# Patient Record
Sex: MALE | Race: WHITE
[De-identification: names, ages, dates, MRNs, and addresses within clinical notes are randomized per-mention and may not be internally consistent; named-entity substitution may affect disease eponyms.]

---

## 2018-05-25 ENCOUNTER — HOSPITAL ENCOUNTER (INPATIENT)
Dept: HOSPITAL 17 - NEPE | Age: 33
Discharge: LEFT BEFORE DISCHARGE | DRG: 917 | End: 2018-05-25
Attending: INTERNAL MEDICINE | Admitting: INTERNAL MEDICINE
Payer: SELF-PAY

## 2018-05-25 VITALS
OXYGEN SATURATION: 96 % | RESPIRATION RATE: 15 BRPM | HEART RATE: 75 BPM | DIASTOLIC BLOOD PRESSURE: 86 MMHG | SYSTOLIC BLOOD PRESSURE: 122 MMHG

## 2018-05-25 VITALS — HEART RATE: 61 BPM | RESPIRATION RATE: 17 BRPM | OXYGEN SATURATION: 95 %

## 2018-05-25 VITALS — HEIGHT: 71 IN | WEIGHT: 196.21 LBS | BODY MASS INDEX: 27.47 KG/M2

## 2018-05-25 VITALS
RESPIRATION RATE: 17 BRPM | HEART RATE: 61 BPM | SYSTOLIC BLOOD PRESSURE: 126 MMHG | DIASTOLIC BLOOD PRESSURE: 58 MMHG | OXYGEN SATURATION: 95 %

## 2018-05-25 VITALS
OXYGEN SATURATION: 95 % | TEMPERATURE: 98 F | RESPIRATION RATE: 18 BRPM | DIASTOLIC BLOOD PRESSURE: 57 MMHG | SYSTOLIC BLOOD PRESSURE: 117 MMHG | HEART RATE: 65 BPM

## 2018-05-25 VITALS
RESPIRATION RATE: 9 BRPM | TEMPERATURE: 97.7 F | HEART RATE: 68 BPM | OXYGEN SATURATION: 95 % | DIASTOLIC BLOOD PRESSURE: 57 MMHG | SYSTOLIC BLOOD PRESSURE: 109 MMHG

## 2018-05-25 VITALS
RESPIRATION RATE: 11 BRPM | OXYGEN SATURATION: 96 % | HEART RATE: 57 BPM | DIASTOLIC BLOOD PRESSURE: 59 MMHG | SYSTOLIC BLOOD PRESSURE: 110 MMHG

## 2018-05-25 VITALS — SYSTOLIC BLOOD PRESSURE: 125 MMHG | HEART RATE: 52 BPM | DIASTOLIC BLOOD PRESSURE: 77 MMHG

## 2018-05-25 VITALS
RESPIRATION RATE: 16 BRPM | SYSTOLIC BLOOD PRESSURE: 109 MMHG | DIASTOLIC BLOOD PRESSURE: 55 MMHG | OXYGEN SATURATION: 95 % | HEART RATE: 66 BPM

## 2018-05-25 DIAGNOSIS — T40.4X4A: Primary | ICD-10-CM

## 2018-05-25 DIAGNOSIS — J96.00: ICD-10-CM

## 2018-05-25 DIAGNOSIS — Z72.0: ICD-10-CM

## 2018-05-25 DIAGNOSIS — T40.1X4A: ICD-10-CM

## 2018-05-25 DIAGNOSIS — F19.10: ICD-10-CM

## 2018-05-25 LAB
ALBUMIN SERPL-MCNC: 4 GM/DL (ref 3.4–5)
ALP SERPL-CCNC: 65 U/L (ref 45–117)
ALT SERPL-CCNC: 88 U/L (ref 12–78)
AST SERPL-CCNC: 73 U/L (ref 15–37)
BASOPHILS # BLD AUTO: 0 TH/MM3 (ref 0–0.2)
BASOPHILS NFR BLD: 0.2 % (ref 0–2)
BILIRUB SERPL-MCNC: 0.7 MG/DL (ref 0.2–1)
BUN SERPL-MCNC: 19 MG/DL (ref 7–18)
CALCIUM SERPL-MCNC: 8.7 MG/DL (ref 8.5–10.1)
CHLORIDE SERPL-SCNC: 102 MEQ/L (ref 98–107)
CREAT SERPL-MCNC: 0.9 MG/DL (ref 0.6–1.3)
EOSINOPHIL # BLD: 0.1 TH/MM3 (ref 0–0.4)
EOSINOPHIL NFR BLD: 0.4 % (ref 0–4)
ERYTHROCYTE [DISTWIDTH] IN BLOOD BY AUTOMATED COUNT: 12.5 % (ref 11.6–17.2)
GFR SERPLBLD BASED ON 1.73 SQ M-ARVRAT: 99 ML/MIN (ref 89–?)
GLUCOSE SERPL-MCNC: 151 MG/DL (ref 74–106)
HCO3 BLD-SCNC: 31.4 MEQ/L (ref 21–32)
HCT VFR BLD CALC: 42.8 % (ref 39–51)
HGB BLD-MCNC: 14.1 GM/DL (ref 13–17)
LYMPHOCYTES # BLD AUTO: 1.8 TH/MM3 (ref 1–4.8)
LYMPHOCYTES NFR BLD AUTO: 9 % (ref 9–44)
MCH RBC QN AUTO: 30.2 PG (ref 27–34)
MCHC RBC AUTO-ENTMCNC: 32.9 % (ref 32–36)
MCV RBC AUTO: 91.9 FL (ref 80–100)
MONOCYTE #: 1.2 TH/MM3 (ref 0–0.9)
MONOCYTES NFR BLD: 6 % (ref 0–8)
NEUTROPHILS # BLD AUTO: 16.6 TH/MM3 (ref 1.8–7.7)
NEUTROPHILS NFR BLD AUTO: 84.4 % (ref 16–70)
PLATELET # BLD: 277 TH/MM3 (ref 150–450)
PMV BLD AUTO: 7.9 FL (ref 7–11)
PROT SERPL-MCNC: 7.9 GM/DL (ref 6.4–8.2)
RBC # BLD AUTO: 4.66 MIL/MM3 (ref 4.5–5.9)
SODIUM SERPL-SCNC: 140 MEQ/L (ref 136–145)
WBC # BLD AUTO: 19.6 TH/MM3 (ref 4–11)

## 2018-05-25 PROCEDURE — 80307 DRUG TEST PRSMV CHEM ANLYZR: CPT

## 2018-05-25 PROCEDURE — 71045 X-RAY EXAM CHEST 1 VIEW: CPT

## 2018-05-25 PROCEDURE — 96365 THER/PROPH/DIAG IV INF INIT: CPT

## 2018-05-25 PROCEDURE — 80053 COMPREHEN METABOLIC PANEL: CPT

## 2018-05-25 PROCEDURE — 96376 TX/PRO/DX INJ SAME DRUG ADON: CPT

## 2018-05-25 PROCEDURE — 85025 COMPLETE CBC W/AUTO DIFF WBC: CPT

## 2018-05-25 PROCEDURE — 96375 TX/PRO/DX INJ NEW DRUG ADDON: CPT

## 2018-05-25 PROCEDURE — 87641 MR-STAPH DNA AMP PROBE: CPT

## 2018-05-25 RX ADMIN — IPRATROPIUM BROMIDE AND ALBUTEROL SULFATE SCH AMPULE: .5; 3 SOLUTION RESPIRATORY (INHALATION) at 15:02

## 2018-05-25 RX ADMIN — IPRATROPIUM BROMIDE AND ALBUTEROL SULFATE SCH AMPULE: .5; 3 SOLUTION RESPIRATORY (INHALATION) at 11:18

## 2018-05-25 NOTE — PD
HPI


.


Heroin overdose


Chief Complaint:  OD/ Ingestion


Time Seen by Provider:  06:03


Travel History


International Travel<30 days:  No


Contact w/Intl Traveler<30days:  No


Traveled to known affect area:  No





History of Present Illness


HPI


Patient is found in a hotel he apparently injected himself with "$5 worth of 

heroin and was found obtunded unresponsive respiratory impression deciding 

paramedics arrived to give an IV access and then 0.4 mg of Narcan patient wakes 

up arrives there is no sign of trauma he was found lying on a couch and in the 

ER he is awake but still somnolent no other past medical history ROS and HPI is 

limited due to patient being intoxicated with heroin.. pt  after  repeated dose 

of  narcan i nER  he is  Nauseous  and  I ask if there was Fentanyl in the IV  

"  probably " he replies  ,  Pt may need repeat dosing  of Narcan  , he 

received  0.4mg in EMS  and  0.4 mg  in  ER Exam room on  first  30 minutes of 

arrival





[facility]


Past Medical History


Medical History:  Denies Significant Hx





Past Surgical History


Surgical History:  No Previous Surgery





Social History


Alcohol Use:  No


Tobacco Use:  Yes


Substance Use:  Yes (HEROIN, METH )





Allergies-Medications


(Allergen,Severity, Reaction):  


Coded Allergies:  


     No Known Allergies (Unverified , 5/25/18)


Reported Meds & Prescriptions





Reported Meds & Active Scripts


Active


No Active Prescriptions or Reported Medications    








Review of Systems


ROS Limitations:  Intoxication (Heroin overdose)





Physical Exam


Narrative


GENERAL: Mildly somnolent but awake no signs of trauma


SKIN: Warm and dry.


HEAD: Atraumatic. Normocephalic.  No signs of trauma


EYES: Pupils equal and round. No scleral icterus. No injection or drainage. 


ENT: No nasal bleeding or discharge.  Mucous membranes pink and moist.


NECK: Trachea midline. No JVD. 


CARDIOVASCULAR: Regular rate and rhythm.  


RESPIRATORY: No accessory muscle use. Clear to auscultation. Breath sounds 

equal bilaterally. 


GASTROINTESTINAL: Abdomen soft, non-tender, nondistended. Hepatic and splenic 

margins not palpable. 


MUSCULOSKELETAL: Extremities without clubbing, cyanosis, or edema. No obvious 

deformities. 


NEUROLOGICAL: Awake . No obvious cranial nerve deficits.  Motor grossly within 

normal limits. Five out of 5 muscle strength in the arms and legs. 


PSYCHIATRIC: Somnolent but arousable





Data


Data


Last Documented VS





Vital Signs








  Date Time  Temp Pulse Resp B/P (MAP) Pulse Ox O2 Delivery O2 Flow Rate FiO2


 


5/25/18 07:15     98 Partial Rebreather 15.00 


 


5/25/18 06:04 98.0 65 18 117/57 (77)    








Orders





 Orders


Naloxone Inj (Narcan Inj) (5/25/18 06:15)


Naloxone Inj (Narcan Inj) (5/25/18 06:11)


Ondansetron  Odt (Zofran  Odt) (5/25/18 06:15)


Complete Blood Count With Diff (5/25/18 07:01)


Comprehensive Metabolic Panel (5/25/18 07:01)


Alcohol (Ethanol) (5/25/18 07:01)


Chest, Single Ap (5/25/18 )


Naloxone Inj (Narcan Inj) (5/25/18 07:30)


Metoclopramide Inj (Reglan Inj) (5/25/18 07:30)


Naloxone Inj (Narcan Inj) (5/25/18 08:15)


Admit Order (Ed Use Only) (5/25/18 09:55)





Labs





Laboratory Tests








Test


  5/25/18


07:11


 


White Blood Count 19.6 TH/MM3 


 


Red Blood Count 4.66 MIL/MM3 


 


Hemoglobin 14.1 GM/DL 


 


Hematocrit 42.8 % 


 


Mean Corpuscular Volume 91.9 FL 


 


Mean Corpuscular Hemoglobin 30.2 PG 


 


Mean Corpuscular Hemoglobin


Concent 32.9 % 


 


 


Red Cell Distribution Width 12.5 % 


 


Platelet Count 277 TH/MM3 


 


Mean Platelet Volume 7.9 FL 


 


Neutrophils (%) (Auto) 84.4 % 


 


Lymphocytes (%) (Auto) 9.0 % 


 


Monocytes (%) (Auto) 6.0 % 


 


Eosinophils (%) (Auto) 0.4 % 


 


Basophils (%) (Auto) 0.2 % 


 


Neutrophils # (Auto) 16.6 TH/MM3 


 


Lymphocytes # (Auto) 1.8 TH/MM3 


 


Monocytes # (Auto) 1.2 TH/MM3 


 


Eosinophils # (Auto) 0.1 TH/MM3 


 


Basophils # (Auto) 0.0 TH/MM3 


 


CBC Comment DIFF FINAL 


 


Differential Comment  


 


Blood Urea Nitrogen 19 MG/DL 


 


Creatinine 0.90 MG/DL 


 


Random Glucose 151 MG/DL 


 


Total Protein 7.9 GM/DL 


 


Albumin 4.0 GM/DL 


 


Calcium Level 8.7 MG/DL 


 


Alkaline Phosphatase 65 U/L 


 


Aspartate Amino Transf


(AST/SGOT) 73 U/L 


 


 


Alanine Aminotransferase


(ALT/SGPT) 88 U/L 


 


 


Total Bilirubin 0.7 MG/DL 


 


Sodium Level 140 MEQ/L 


 


Potassium Level 3.7 MEQ/L 


 


Chloride Level 102 MEQ/L 


 


Carbon Dioxide Level 31.4 MEQ/L 


 


Anion Gap 7 MEQ/L 


 


Estimat Glomerular Filtration


Rate 99 ML/MIN 


 


 


Ethyl Alcohol Level


  LESS THAN 3


MG/DL











MDM


Medical Decision Making


Medical Screen Exam Complete:  Yes


Emergency Medical Condition:  Yes


Differential Diagnosis


heroin  IVDA  overdose  respiratory failure  vs   fentanyl overdose    

polysubstance  overdose,  otehr


Narrative Course


Pt  given   0.4 mg   Narcan  EN route  and  in  Exam  I  given another  dose of

  Narcan,  pt  still sleepy  and    may have  used  extremely potent  fentanyl 

  and  will need repeated  doses of narcan    ,  signed out to next  MD  at  7 

AM





Diagnosis





 Primary Impression:  


 Heroin overdose


 Qualified Codes:  T40.1X1A - Poisoning by heroin, accidental (unintentional), 

initial encounter


Scripts


No Active Prescriptions or Reported Meds











Manpreet Chin MD May 25, 2018 06:12

## 2018-05-25 NOTE — HHI.HP
HPI


Service


Critical Care Medicine


Primary Care Physician


No Primary Care Physician


Admission Diagnosis





Heroin overdose, hypoxia


Diagnosis:  


Travel History


International Travel<30 Days:  No


Contact w/Intl Traveler <30 Da:  No


Traveled to Known Affected Are:  No


History of Present Illness


HPI





32-year-old male who reportedly injected himself with heroin laced with 

fentanyl IV and a hotel room around 4 AM this morning.  He was brought to the 

ER after being found unresponsive.  He received Narcan multiple doses and 

continued to have episodes of apnea and hypoxia and was placed on a 

nonrebreather facemask.  He was subsequently started on a Narcan drip.  

Critical care was contacted and accepted patient for admission to the ICU.  

When I evaluated the patient in the ER he was on a Narcan drip and awake and 

alert.  He was not in any acute distress.  He gave me details of his history.  

He denied any other major medical problems.





[facility]


Past Medical History


Medical History:  Denies Significant Hx





Past Surgical History


Surgical History:  No Previous Surgery





Social History


Alcohol Use:  No


Tobacco Use:  Yes


Substance Use:  Yes (HEROIN, METH )





Allergies-Medications


(Allergen,Severity, Reaction):  


Coded Allergies:  


     No Known Allergies (Unverified , 18)


Reported Meds & Prescriptions





Reported Meds & Active Scripts


Active


No Active Prescriptions or Reported Medications    








Review of Systems


ROS Limitations:  Intoxication (Heroin overdose)





Physical Exam


Vital Signs





Vital Signs








  Date Time  Temp Pulse Resp B/P (MAP) Pulse Ox O2 Delivery O2 Flow Rate FiO2


 


18 12:13        


 


18 11:09  66 16 109/55 (73) 95 Room Air  


 


18 07:15     98 Partial Rebreather 15.00 


 


18 06:04 98.0 65 18 117/57 (77) 95   








Physical Exam


HEENT/Neuro: No pallor or icterus, tongue moist, IRMA, Awake alert oriented 3

, nonfocal grossly, moving all 4 extremities


Neck: No JVD


Chest/pulmonary: CTA bilaterally


Cardiovascular: S1-S2 regular no gallop or murmur


GI/abdomen: Soft, nontender, bowel sounds present


Extremities: Warm bilaterally, no edema


Laboratory





Laboratory Tests








Test


  18


07:11


 


White Blood Count 19.6 


 


Red Blood Count 4.66 


 


Hemoglobin 14.1 


 


Hematocrit 42.8 


 


Mean Corpuscular Volume 91.9 


 


Mean Corpuscular Hemoglobin 30.2 


 


Mean Corpuscular Hemoglobin


Concent 32.9 


 


 


Red Cell Distribution Width 12.5 


 


Platelet Count 277 


 


Mean Platelet Volume 7.9 


 


Neutrophils (%) (Auto) 84.4 


 


Lymphocytes (%) (Auto) 9.0 


 


Monocytes (%) (Auto) 6.0 


 


Eosinophils (%) (Auto) 0.4 


 


Basophils (%) (Auto) 0.2 


 


Neutrophils # (Auto) 16.6 


 


Lymphocytes # (Auto) 1.8 


 


Monocytes # (Auto) 1.2 


 


Eosinophils # (Auto) 0.1 


 


Basophils # (Auto) 0.0 


 


CBC Comment DIFF FINAL 


 


Differential Comment  


 


Blood Urea Nitrogen 19 


 


Creatinine 0.90 


 


Random Glucose 151 


 


Total Protein 7.9 


 


Albumin 4.0 


 


Calcium Level 8.7 


 


Alkaline Phosphatase 65 


 


Aspartate Amino Transf


(AST/SGOT) 73 


 


 


Alanine Aminotransferase


(ALT/SGPT) 88 


 


 


Total Bilirubin 0.7 


 


Sodium Level 140 


 


Potassium Level 3.7 


 


Chloride Level 102 


 


Carbon Dioxide Level 31.4 


 


Anion Gap 7 


 


Estimat Glomerular Filtration


Rate 99 


 


 


Ethyl Alcohol Level LESS THAN 3 








Result Diagram:  


18 0711                                                                   

             18 0711








Caprini VTE Risk Assessment


Caprini VTE Risk Assessment:  No/Low Risk (score <= 1)


Caprini Risk Assessment Model











 Point Value = 1          Point Value = 2  Point Value = 3  Point Value = 5


 


Age 41-60


Minor surgery


BMI > 25 kg/m2


Swollen legs


Varicose veins


Pregnancy or postpartum


History of unexplained or recurrent


   spontaneous 


Oral contraceptives or hormone


   replacement


Sepsis (< 1 month)


Serious lung disease, including


   pneumonia (< 1 month)


Abnormal pulmonary function


Acute myocardial infarction


Congestive heart failure (< 1 month)


History of inflammatory bowel disease


Medical patient at bed rest Age 61-74


Arthroscopic surgery


Major open surgery (> 45 min)


Laparoscopic surgery (> 45 min)


Malignancy


Confined to bed (> 72 hours)


Immobilizing plaster cast


Central venous access Age >= 75


History of VTE


Family history of VTE


Factor V Leiden


Prothrombin 17624O


Lupus anticoagulant


Anticardiolipin antibodies


Elevated serum homocysteine


Heparin-induced thrombocytopenia


Other congenital or acquired


   thrombophilia Stroke (< 1 month)


Elective arthroplasty


Hip, pelvis, or leg fracture


Acute spinal cord injury (< 1 month)








Prophylaxis Regimen











   Total Risk


Factor Score Risk Level Prophylaxis Regimen


 


0-1      Low Early ambulation


 


2 Moderate Order ONE of the following:


*Sequential Compression Device (SCD)


*Heparin 5000 units SQ BID


 


3-4 Higher Order ONE of the following medications:


*Heparin 5000 units SQ TID


*Enoxaparin/Lovenox 40 mg SQ daily (WT < 150 kg, CrCl > 30 mL/min)


*Enoxaparin/Lovenox 30 mg SQ daily (WT < 150 kg, CrCl > 10-29 mL/min)


*Enoxaparin/Lovenox 30 mg SQ BID (WT < 150 kg, CrCl > 30 mL/min)


AND/OR


*Sequential Compression Device (SCD)


 


5 or more Highest Order ONE of the following medications:


*Heparin 5000 units SQ TID (Preferred with Epidurals)


*Enoxaparin/Lovenox 40 mg SQ daily (WT < 150 kg, CrCl > 30 mL/min)


*Enoxaparin/Lovenox 30 mg SQ daily (WT < 150 kg, CrCl > 10-29 mL/min)


*Enoxaparin/Lovenox 30 mg SQ BID (WT < 150 kg, CrCl > 30 mL/min)


AND


*Sequential Compression Device (SCD)











Assessment and Plan


Assessment and Plan


32-year-old male with:


Heroin/fentanyl overdose


Substance abuse


Acute respiratory failure





Plan:


Admitted to ICU.


Titrated off Narcan drip provided neurologic status does not worsen.


N.p.o.


IV hydration


Follow respiratory status.











Singh Alcaraz MD May 25, 2018 13:58

## 2018-05-25 NOTE — RADRPT
EXAM DATE:  5/25/2018 7:42 AM EDT

AGE/SEX:        32 years / Male



INDICATIONS:  Shortness of breath after heroin overdose.



CLINICAL DATA:  This is the patient's initial encounter. Patient reports that signs and symptoms have
 been present for 1 day and indicates a pain score of 0/10. 

                                                                          

MEDICAL/SURGICAL HISTORY:       None. None.



COMPARISON:      No prior [facility] exams available for comparison.



FINDINGS:  

A single AP view of the chest demonstrates the lungs to be symmetrically aerated without evidence of 
mass, infiltrate or effusion.  The cardiomediastinal contours are unremarkable.  Osseous structures a
re intact.  





CONCLUSION: 

1.  No acute cardiopulmonary disease.



Electronically signed by: Josiah Teixeira MD  5/25/2018 7:52 AM EDT

## 2018-05-25 NOTE — PD
Physical Exam


Date Seen by Provider:  May 25, 2018


Time Seen by Provider:  06:55


Narrative


The patient is a 32-year-old  male who was initially evaluated by the 

previous physician.  Please refer to the initial history, physical, diagnostic 

evaluation, and treatment modality plan.  The patient was signed out at 7 AM 

with observation pending after heroin overdose.





Data


Data


Last Documented VS





Vital Signs








  Date Time  Temp Pulse Resp B/P (MAP) Pulse Ox O2 Delivery O2 Flow Rate FiO2


 


5/25/18 07:15     98 Partial Rebreather 15.00 


 


5/25/18 06:04 98.0 65 18 117/57 (77)    








Orders





 Orders


Naloxone Inj (Narcan Inj) (5/25/18 06:15)


Naloxone Inj (Narcan Inj) (5/25/18 06:11)


Ondansetron  Odt (Zofran  Odt) (5/25/18 06:15)


Complete Blood Count With Diff (5/25/18 07:01)


Comprehensive Metabolic Panel (5/25/18 07:01)


Alcohol (Ethanol) (5/25/18 07:01)


Drug Screen, Random Urine (5/25/18 07:01)


Chest, Single Ap (5/25/18 )


Naloxone Inj (Narcan Inj) (5/25/18 07:30)


Metoclopramide Inj (Reglan Inj) (5/25/18 07:30)


Naloxone Inj (Narcan Inj) (5/25/18 08:15)


Admit Order (Ed Use Only) (5/25/18 09:55)





Labs





Laboratory Tests








Test


  5/25/18


07:11


 


White Blood Count 19.6 TH/MM3 


 


Red Blood Count 4.66 MIL/MM3 


 


Hemoglobin 14.1 GM/DL 


 


Hematocrit 42.8 % 


 


Mean Corpuscular Volume 91.9 FL 


 


Mean Corpuscular Hemoglobin 30.2 PG 


 


Mean Corpuscular Hemoglobin


Concent 32.9 % 


 


 


Red Cell Distribution Width 12.5 % 


 


Platelet Count 277 TH/MM3 


 


Mean Platelet Volume 7.9 FL 


 


Neutrophils (%) (Auto) 84.4 % 


 


Lymphocytes (%) (Auto) 9.0 % 


 


Monocytes (%) (Auto) 6.0 % 


 


Eosinophils (%) (Auto) 0.4 % 


 


Basophils (%) (Auto) 0.2 % 


 


Neutrophils # (Auto) 16.6 TH/MM3 


 


Lymphocytes # (Auto) 1.8 TH/MM3 


 


Monocytes # (Auto) 1.2 TH/MM3 


 


Eosinophils # (Auto) 0.1 TH/MM3 


 


Basophils # (Auto) 0.0 TH/MM3 


 


CBC Comment DIFF FINAL 


 


Differential Comment  


 


Blood Urea Nitrogen 19 MG/DL 


 


Creatinine 0.90 MG/DL 


 


Random Glucose 151 MG/DL 


 


Total Protein 7.9 GM/DL 


 


Albumin 4.0 GM/DL 


 


Calcium Level 8.7 MG/DL 


 


Alkaline Phosphatase 65 U/L 


 


Aspartate Amino Transf


(AST/SGOT) 73 U/L 


 


 


Alanine Aminotransferase


(ALT/SGPT) 88 U/L 


 


 


Total Bilirubin 0.7 MG/DL 


 


Sodium Level 140 MEQ/L 


 


Potassium Level 3.7 MEQ/L 


 


Chloride Level 102 MEQ/L 


 


Carbon Dioxide Level 31.4 MEQ/L 


 


Anion Gap 7 MEQ/L 


 


Estimat Glomerular Filtration


Rate 99 ML/MIN 


 


 


Ethyl Alcohol Level


  LESS THAN 3


MG/DL











[facility]


Medical Record Reviewed:  Yes


Supervised Visit with MAURISIO:  No


Interpretation(s)





Last Impressions








Chest X-Ray 5/25/18 0000 Signed





Impressions: 





 CONCLUSION: 





 1.  No acute cardiopulmonary disease.





  





 








Laboratory Tests








Test


  5/25/18


07:11


 


White Blood Count 19.6 TH/MM3 


 


Red Blood Count 4.66 MIL/MM3 


 


Hemoglobin 14.1 GM/DL 


 


Hematocrit 42.8 % 


 


Mean Corpuscular Volume 91.9 FL 


 


Mean Corpuscular Hemoglobin 30.2 PG 


 


Mean Corpuscular Hemoglobin


Concent 32.9 % 


 


 


Red Cell Distribution Width 12.5 % 


 


Platelet Count 277 TH/MM3 


 


Mean Platelet Volume 7.9 FL 


 


Neutrophils (%) (Auto) 84.4 % 


 


Lymphocytes (%) (Auto) 9.0 % 


 


Monocytes (%) (Auto) 6.0 % 


 


Eosinophils (%) (Auto) 0.4 % 


 


Basophils (%) (Auto) 0.2 % 


 


Neutrophils # (Auto) 16.6 TH/MM3 


 


Lymphocytes # (Auto) 1.8 TH/MM3 


 


Monocytes # (Auto) 1.2 TH/MM3 


 


Eosinophils # (Auto) 0.1 TH/MM3 


 


Basophils # (Auto) 0.0 TH/MM3 


 


CBC Comment DIFF FINAL 


 


Differential Comment  


 


Blood Urea Nitrogen 19 MG/DL 


 


Creatinine 0.90 MG/DL 


 


Random Glucose 151 MG/DL 


 


Total Protein 7.9 GM/DL 


 


Albumin 4.0 GM/DL 


 


Calcium Level 8.7 MG/DL 


 


Alkaline Phosphatase 65 U/L 


 


Aspartate Amino Transf


(AST/SGOT) 73 U/L 


 


 


Alanine Aminotransferase


(ALT/SGPT) 88 U/L 


 


 


Total Bilirubin 0.7 MG/DL 


 


Sodium Level 140 MEQ/L 


 


Potassium Level 3.7 MEQ/L 


 


Chloride Level 102 MEQ/L 


 


Carbon Dioxide Level 31.4 MEQ/L 


 


Anion Gap 7 MEQ/L 


 


Estimat Glomerular Filtration


Rate 99 ML/MIN 


 


 


Ethyl Alcohol Level


  LESS THAN 3


MG/DL








Differential Diagnosis


Differential diagnosis includes heroin overdose, polysubstance abuse, opiate 

ingestion, alcohol intoxication, hypoxia, aspiration.


Narrative Course


The patient is a 32-year-old male was initially evaluated by the previous 

physician.  Please refer to the initial history, physical, diagnostic evaluation

, and treatment modality plan.  The patient was signed out at 7 AM with 

observation pending after heroin overdose.  The patient was injected Narcan 0.4 

mg intravenously in the field, and then was redosed with Narcan 0.4 mg 

intravenously in the emergency department.  The patient will be observed for 

evaluation of hypoxia after heroin ingestion and subsequent Narcan 

administration.





The patient was reevaluated at 7 AM, was satting 88% on 4 L via nasal cannula.  

He was arousable with verbal stimuli.  However, the patient was hypoxic on 

oxygen, therefore, chest x-ray was obtained.  Alcohol level drug screen were 

sent to lab.  The patient continued to be monitored.





The patient was reevaluated at 7:15 AM, had desatted to 74%, was placed on a 

nonrebreather and administered Narcan 0.8 mg intravenously.





The patient was reevaluated at 8 AM, was breathing at a respiratory rate of 8-10

/min, O2 sat 98% on nonrebreather.





The patient was reevaluated at 8:15 AM, still has a respiratory rate of 

approximately 8-10, satting 99% on a nonrebreather.  The patient is arousable 

to loud verbal stimuli and/or painful stimuli.  Patient has multiple doses of 

Narcan, therefore, will be placed on a Narcan drip in an attempt to avoid 

intubation, patient will be admitted to intensive care unit.





The patient was evaluated at 9:05 AM, his O2 sat is now 96% on O2 via nasal 

cannula at 4 L, currently on a Narcan drip, arousable to verbal stimuli.


Critical Care Narrative


Aggregate critical care time was 40 minutes. Time to perform other separately 

billable procedures was not included in the critical care time. My time did not 

include minutes spent treating any other patients simultaneously or on 

activities that did not directly contribute to the patient's treatment.  





The services I provided to this patient were to treat and/or prevent clinically 

significant deterioration that could result in: Anoxia, hypoxia, aspiration





I provided critical care services requiring my management, as noted below:


Chart data review, documentation time, medication orders and management, vital 

sign assessments/reviewing monitor data, ordering and reviewing lab tests, 

ordering and interpreting/reviewing x-rays and diagnostic studies, care of the 

patient and discussion of the patient with the admitting physicians.


Physician Communication


Physician Communication


The on-call intensivist was paged for admission.  I discussed the patient with 

Dr. Alcaraz who agrees with admission.





Diagnosis





 Primary Impression:  


 Heroin overdose


 Qualified Codes:  T40.1X1A - Poisoning by heroin, accidental (unintentional), 

initial encounter


 Additional Impression:  


 Hypoxia





Admitting Information


Admitting Physician Requests:  Admit


Scripts


No Active Prescriptions or Reported Meds


Condition:  Stable











Luis Ramon MD May 25, 2018 06:56